# Patient Record
Sex: MALE | Race: WHITE | NOT HISPANIC OR LATINO | Employment: OTHER | URBAN - METROPOLITAN AREA
[De-identification: names, ages, dates, MRNs, and addresses within clinical notes are randomized per-mention and may not be internally consistent; named-entity substitution may affect disease eponyms.]

---

## 2022-01-01 ENCOUNTER — OFFICE VISIT (OUTPATIENT)
Dept: RADIATION ONCOLOGY | Facility: CLINIC | Age: 62
End: 2022-01-01
Attending: RADIOLOGY
Payer: COMMERCIAL

## 2022-01-01 ENCOUNTER — TELEPHONE (OUTPATIENT)
Dept: RADIATION ONCOLOGY | Facility: CLINIC | Age: 62
End: 2022-01-01
Payer: COMMERCIAL

## 2022-01-01 ENCOUNTER — TELEPHONE (OUTPATIENT)
Dept: RADIATION ONCOLOGY | Facility: CLINIC | Age: 62
End: 2022-01-01

## 2022-01-01 ENCOUNTER — HOSPITAL ENCOUNTER (EMERGENCY)
Facility: CLINIC | Age: 62
Discharge: HOME | End: 2022-03-08
Attending: RADIOLOGY | Admitting: EMERGENCY MEDICINE
Payer: COMMERCIAL

## 2022-01-01 ENCOUNTER — HOSPITAL ENCOUNTER (OUTPATIENT)
Dept: MRI IMAGING | Facility: CLINIC | Age: 62
Discharge: HOME | End: 2022-06-16
Attending: RADIOLOGY | Admitting: RADIOLOGY
Payer: MEDICARE

## 2022-01-01 ENCOUNTER — OFFICE VISIT (OUTPATIENT)
Dept: RADIATION ONCOLOGY | Facility: CLINIC | Age: 62
End: 2022-01-01
Attending: RADIOLOGY
Payer: MEDICARE

## 2022-01-01 ENCOUNTER — HOSPITAL ENCOUNTER (OUTPATIENT)
Dept: MRI IMAGING | Facility: CLINIC | Age: 62
End: 2022-03-09
Attending: RADIOLOGY
Payer: COMMERCIAL

## 2022-01-01 ENCOUNTER — PRE VISIT (OUTPATIENT)
Dept: RADIATION ONCOLOGY | Facility: CLINIC | Age: 62
End: 2022-01-01
Payer: COMMERCIAL

## 2022-01-01 ENCOUNTER — ONCOLOGY VISIT (OUTPATIENT)
Dept: RADIATION ONCOLOGY | Facility: CLINIC | Age: 62
End: 2022-01-01
Payer: MEDICARE

## 2022-01-01 VITALS
HEIGHT: 67 IN | OXYGEN SATURATION: 97 % | HEART RATE: 68 BPM | BODY MASS INDEX: 27.78 KG/M2 | WEIGHT: 177 LBS | SYSTOLIC BLOOD PRESSURE: 126 MMHG | DIASTOLIC BLOOD PRESSURE: 66 MMHG | RESPIRATION RATE: 16 BRPM

## 2022-01-01 VITALS
HEART RATE: 62 BPM | DIASTOLIC BLOOD PRESSURE: 75 MMHG | BODY MASS INDEX: 30.49 KG/M2 | OXYGEN SATURATION: 98 % | WEIGHT: 194.7 LBS | SYSTOLIC BLOOD PRESSURE: 135 MMHG

## 2022-01-01 VITALS
OXYGEN SATURATION: 100 % | SYSTOLIC BLOOD PRESSURE: 145 MMHG | HEART RATE: 52 BPM | DIASTOLIC BLOOD PRESSURE: 82 MMHG | RESPIRATION RATE: 16 BRPM

## 2022-01-01 DIAGNOSIS — C79.31 METASTASIS TO BRAIN (H): Primary | ICD-10-CM

## 2022-01-01 DIAGNOSIS — C79.31 METASTASIS TO BRAIN (H): ICD-10-CM

## 2022-01-01 LAB
FLUAV RNA SPEC QL NAA+PROBE: NEGATIVE
FLUBV RNA RESP QL NAA+PROBE: NEGATIVE
RSV RNA SPEC NAA+PROBE: NEGATIVE
SARS-COV-2 RNA RESP QL NAA+PROBE: NEGATIVE

## 2022-01-01 PROCEDURE — 77435 SBRT MANAGEMENT: CPT | Performed by: RADIOLOGY

## 2022-01-01 PROCEDURE — 77334 RADIATION TREATMENT AID(S): CPT | Mod: 26 | Performed by: RADIOLOGY

## 2022-01-01 PROCEDURE — 77370 RADIATION PHYSICS CONSULT: CPT | Performed by: RADIOLOGY

## 2022-01-01 PROCEDURE — A9585 GADOBUTROL INJECTION: HCPCS | Performed by: RADIOLOGY

## 2022-01-01 PROCEDURE — 77373 STRTCTC BDY RAD THER TX DLVR: CPT | Performed by: RADIOLOGY

## 2022-01-01 PROCEDURE — 77470 SPECIAL RADIATION TREATMENT: CPT | Mod: 26 | Performed by: RADIOLOGY

## 2022-01-01 PROCEDURE — 77295 3-D RADIOTHERAPY PLAN: CPT | Performed by: RADIOLOGY

## 2022-01-01 PROCEDURE — 70552 MRI BRAIN STEM W/DYE: CPT | Mod: 26 | Performed by: STUDENT IN AN ORGANIZED HEALTH CARE EDUCATION/TRAINING PROGRAM

## 2022-01-01 PROCEDURE — 77336 RADIATION PHYSICS CONSULT: CPT | Performed by: RADIOLOGY

## 2022-01-01 PROCEDURE — 77334 RADIATION TREATMENT AID(S): CPT | Performed by: RADIOLOGY

## 2022-01-01 PROCEDURE — 77263 THER RADIOLOGY TX PLNG CPLX: CPT | Performed by: RADIOLOGY

## 2022-01-01 PROCEDURE — 255N000002 HC RX 255 OP 636: Performed by: RADIOLOGY

## 2022-01-01 PROCEDURE — 70553 MRI BRAIN STEM W/O & W/DYE: CPT | Mod: 26 | Performed by: RADIOLOGY

## 2022-01-01 PROCEDURE — G0463 HOSPITAL OUTPT CLINIC VISIT: HCPCS | Performed by: RADIOLOGY

## 2022-01-01 PROCEDURE — C9803 HOPD COVID-19 SPEC COLLECT: HCPCS

## 2022-01-01 PROCEDURE — 87637 SARSCOV2&INF A&B&RSV AMP PRB: CPT | Performed by: EMERGENCY MEDICINE

## 2022-01-01 PROCEDURE — 77290 THER RAD SIMULAJ FIELD CPLX: CPT | Performed by: RADIOLOGY

## 2022-01-01 PROCEDURE — G0463 HOSPITAL OUTPT CLINIC VISIT: HCPCS | Mod: 25

## 2022-01-01 PROCEDURE — 77300 RADIATION THERAPY DOSE PLAN: CPT | Performed by: RADIOLOGY

## 2022-01-01 PROCEDURE — 99024 POSTOP FOLLOW-UP VISIT: CPT | Performed by: NURSE PRACTITIONER

## 2022-01-01 PROCEDURE — 999N000104 HC STATISTIC NO CHARGE

## 2022-01-01 PROCEDURE — 77300 RADIATION THERAPY DOSE PLAN: CPT | Mod: 26 | Performed by: RADIOLOGY

## 2022-01-01 PROCEDURE — 70553 MRI BRAIN STEM W/O & W/DYE: CPT

## 2022-01-01 PROCEDURE — 77470 SPECIAL RADIATION TREATMENT: CPT | Performed by: RADIOLOGY

## 2022-01-01 PROCEDURE — 70552 MRI BRAIN STEM W/DYE: CPT

## 2022-01-01 PROCEDURE — 77295 3-D RADIOTHERAPY PLAN: CPT | Mod: 26 | Performed by: RADIOLOGY

## 2022-01-01 RX ORDER — GADOBUTROL 604.72 MG/ML
8 INJECTION INTRAVENOUS ONCE
Status: COMPLETED | OUTPATIENT
Start: 2022-01-01 | End: 2022-01-01

## 2022-01-01 RX ORDER — PHENYTOIN SODIUM 300 MG/1
300 CAPSULE, EXTENDED RELEASE ORAL
COMMUNITY
Start: 2022-01-01 | End: 2022-01-01

## 2022-01-01 RX ORDER — LORATADINE 10 MG/1
10 TABLET ORAL
COMMUNITY
Start: 2022-01-01

## 2022-01-01 RX ORDER — FOLIC ACID 1 MG/1
1 TABLET ORAL
COMMUNITY
Start: 2022-01-01

## 2022-01-01 RX ORDER — MULTIVITAMIN/IRON/FOLIC ACID 18MG-0.4MG
1 TABLET ORAL DAILY
COMMUNITY
Start: 2022-01-01

## 2022-01-01 RX ORDER — ENOXAPARIN SODIUM 100 MG/ML
80 INJECTION SUBCUTANEOUS EVERY 12 HOURS
COMMUNITY
Start: 2022-01-01

## 2022-01-01 RX ORDER — LEVETIRACETAM 500 MG/1
500 TABLET ORAL 2 TIMES DAILY
COMMUNITY

## 2022-01-01 RX ORDER — GADOBUTROL 604.72 MG/ML
0.1 INJECTION INTRAVENOUS ONCE
Status: COMPLETED | OUTPATIENT
Start: 2022-01-01 | End: 2022-01-01

## 2022-01-01 RX ORDER — LATANOPROST 50 UG/ML
1 SOLUTION/ DROPS OPHTHALMIC AT BEDTIME
COMMUNITY

## 2022-01-01 RX ORDER — DEXAMETHASONE 1 MG
2 TABLET ORAL
COMMUNITY

## 2022-01-01 RX ORDER — ACETAMINOPHEN 500 MG
1000 TABLET ORAL EVERY 6 HOURS PRN
COMMUNITY
Start: 2022-01-01

## 2022-01-01 RX ADMIN — GADOBUTROL 8 ML: 604.72 INJECTION INTRAVENOUS at 08:28

## 2022-01-01 RX ADMIN — GADOBUTROL 7.5 ML: 604.72 INJECTION INTRAVENOUS at 13:55

## 2022-01-01 ASSESSMENT — ENCOUNTER SYMPTOMS
HEADACHES: 0
WEIGHT LOSS: 1
DOUBLE VISION: 0
SEIZURES: 1
SHORTNESS OF BREATH: 0
VOMITING: 0
NAUSEA: 0
FEVER: 0
MUSCULOSKELETAL NEGATIVE: 1
BLURRED VISION: 1
PHOTOPHOBIA: 0
DEPRESSION: 0
BLOOD IN STOOL: 0
DIARRHEA: 0

## 2022-02-23 NOTE — NURSING NOTE
Date: 2022   Age: 61 year old  Ethnicity:    Sex: male  : 1960   Lives In: [place], [place]      Diagnosis: Lung Cancer    Prior radiation therapy:   Site Treated: at  Facility: at  Dates: at  Dose: at    Site Treated: atr  Facility: a  Dates: ar  Dose: a    Prior chemotherapy:   Protocol: at  Facility: at  Dates: at     RN time with patient:  Educated on Gamma Knife;    Doctors: Wallace Corona at time of consult:  Does pt have a living will:  Does pt have implanted cardiac device:     Has pt fallen in past week:  Does pt feel steady on her feet:      Review Since Diagnosis:    21; to ER at [facility] due to a fall on the ice, struck his forehead and had a laceration.  Work up revealed a left frontal brain mass and right lung mass.  Pt was discharged to his home after refusing further work up at this time due to his wife having recently .      21; Head CT, 5.4 x 4.2 x 4.3 cm left frontal mass with edema, 10 mm left to right midline shift, no fracture or intracranial bleed     21; Cervical CT, no cervical spine fracture, mass right lung apex 2.7 x 2.3 x 2.2 cm    21; pt returned to the ER as had an episode of dizziness, nearly falling     21; consult by Dr. Wade Redd, pt notes dizziness is gone.  Wanting Brain MRI and CT CAP, continue the dexamethasone and keppra     21; Brain MRI, 5.3 x 4.4 x 4.3 cm left frontal mass                                     0.3 x 0.6 x 0.4 cm mass left parietal     CT CAP; spiculated lesion right lung apex 2.6 cm with right hilar involvement, few subtle hypodensities in liver indeterminate, no lymphadenopathy, no osseous lesion    21; seen by neurosurgery JESSICA Laird, pt still feeling needs to leave to take care of things before will proceed with any form of treatment     21; pt left AMA    12/15/21; PET, 2.6 cm right upper lobe lung mass, enlarged right hilar lymph node, no evidence distal mets to  neck/abd/pelvis     2/14/22; pt presented back to ER at [facility] to be evaluated for the lung and brain lesions     2/14/22; Brain MRI, 5.3 x 5.0 x 5.4 cm lesion left frontal, increase                                     1.7 cm lesion left temporal                                    0.8 cm lesion left parietal     2/16/22; crani by Dr. Chiang for resection of left frontal met, gross total resection, path showed metastatic carcinoma, lung primary being a diagnostic consideration    2/21/22; seen by PA for neurosurgery as inpt, keppra 3 months, dexamethasone taper, plan to meet with Dr. Mcgee, Dr. Cook to arrange GK    3/3/22; PET  3/3/22; to see Dr. Mcgee,     Chief Complaint: at consult

## 2022-02-26 NOTE — PROGRESS NOTES
Department of Radiation Oncology  [address] [address]  [phone]       Consultation Note    Name: Louis Sales MRN: [id number]   : 1960   Date of Service: Mar 2, 2022 Referring: Dr. Mcgee/ Dr. Cook        Reason for consultation: Discussion of GK SRS for management of intracranial metastases likely secondary to lung primary.     History of Present Illness   Mr. Sales is a 61 year old male with a recent diagnosis of lung cancer with brain metastases.    He initially presented to ER at [facility] on 2021 following a fall on the ice while chasing his dog. He sustained a laceration on his forehead. CT head demonstrated a 5.4 x 4.2 x 4.3 cm left frontal mass with surrounding vasogenic edema and a 10 mm midline shift. There was no evidence of fracture or intracranial bleeding.  CT cervical spine ruled out any spine fracture, but did  a spiculated nodule at the right lung apex. He was recommended brain MRI, but did not want any further workup at that time due to his wife passing away 3 weeks prior. He was started on Decadron.      However, he returned to ED on the next day following an episode of dizziness and found to have bradycardia. He was admitted for cardiac workup. A brain MRI was done on 2021 which redemonstrated the left frontal mass measuring 5.3 x 4.4 x 4.3 cm with intralesional hemorrhage and necrosis as well as another left parietal lesion measuring 0.3 x 0.6 x 0.4 cm. CT of the chest/abdomen/pelvis revealed a spiculated oval mass in the right lung apex measuring 2.6 x 2.0 cm with enlarged right hilar node up to 3 cm. 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     He did have an outpatient PET/CT scan which confirmed the hypermetabolism in the RUL mass and the right hilar node, but otherwise no metastases  "elsewhere in the body. He did not follow up with Dr. Mcgee, medical oncology.     He again was taken to the ED after a near syncope episode on 2/12, but didn't want to stay but agreed to return 2 days later on 2/14/22 for intervention. Stereotactic MRI done prior to surgery showed 3 lesions concerning for metastasis in left frontal (6.5 cm in oblique cross section), left parietal (0.8 cm) and left temporal (1.7 cm) areas.  He underwent left craniotomy and gross total resection of the left frontal lesion on 2/16/2022 by Dr. Chiang. Pathology was consistent with metastatic carcinoma, lung primary being a diagnostic consideration.     The patient was referred to us for discussion of GK SRS. Today he was accompanied by his daughter-in-law. He states that he has been recovering well from surgery. He reports occasional episodes of blurry vision which he attributs to his glaucoma. He denies recent fever, chills, nausea, vomiting, weakness, numbness, visual or auditory changes, weight or appetite changes. He believs he is not on any steroids anymore. However, he will check his medication list and inform us. He is going to meet Dr. Mcgee after obtaining PET/CT tomorrow.     Past Medical History:  Lumbar spine arthritis  Glaucoma  Chronic low back pain  Bradycardia    Past Surgical History:    Epidural steroid injection    Chemotherapy History:  None     Radiation History:  None     Implanted Cardiac Devices: No    Medications:  Keppra    Allergies:    Cat Dancer    Dust/dust mites    Social History:  3 sons. Wife recently passed away  1/4 PPD x 40 years  Occasional alcohol.   Smokes Marijuana    Family History:  Sister: cancer (patient could not recall)    Review of Systems   A 10-point review of systems was performed. Pertinent findings are noted in the HPI.    Physical Exam   ECOG Status: 1    Vitals:  /66   Pulse 68   Resp 16   Ht 1.702 m (5' 7\")   Wt 80.3 kg (177 lb)   SpO2 97%   BMI 27.72 kg/m      PHYSICAL " EXAMINATION:    Gen: Alert, in NAD  Eyes: Clean dry craniotomy incision with no signs of inflammation. Staples are in  HENT      Head: NC/AT     Ears: No external auricular lesions     Nose/sinus: No rhinorrhea or epistaxis     Oral Cavity/Oropharynx: MMM, no thrush noted  Pulm: Breathing comfortably on room air, no audible wheezes or ronchi  CV: Well-perfused, no cyanosis  Abdominal: Soft, nondistended  Skin: Normal color and turgor  Neurologic/MSK: Alert and oriented x3, CN II-XII intact, motor 5/5 throughout, sensation intact to light touch throughout. +2 DTRs. Finger-nose-finger and heel knee-shin intact.    Psych: Appropriate mood and affect     Imaging/Path/Labs   Imaging: Reviewed           Path: Reviewed     Labs: Reviewed     Assessment    Mr. Sales is a 61 year old male with a recent diagnosis of metastatic brain lesions. Work up revealed RUL lung mass and right hilar adenopathy. Even though his lung mass was not biopsied yet, he likely has metastatic lung cancer. He underwent left craniotomy and gross total resection of the symptomatic left frontal lesion. Imaging showed 2 additional smaller lesions. He is recovering well from surgery.    Plan   We recommend a course of GK SRS to his intracranial metastases for local control.  We plan to treat the left frontal resection cavity and the left temporal lesion (in close relation with the brainstem) using a hypofractionated approach over 3-5 fractions using the mask immobilization. For the smaller left parietal lesino, it would be feasible to treat in a single fraction.    We discussed the rationale, logistics, alternatives and potential acute and chronic related side effects including but not limited to fatigue, headaches, nausea, vomiting, and radiation necrosis.  We also discussed the follow-up plan after treatment.    At end of discussion, the patient agreed with the plan and signed the treatment consent.  The patient is scheduled for planning MRI and  simulation session on 3/9/2022. The procedure will be performed in conjunction with Dr. Cook.     The patient and his daughter-in-law had many questions during our conversation that were answered to their satisfaction and verbalized understanding.     He will see Dr. Mcgee tomorrow. If he is still on steroids, we anticipate Dr. Mcgee to clarify the taper schedule with him during the visit.     The patient was seen and discussed with staff, Dr. Simons.     Jarad Enriquez MD  PGY-5 Resident, Radiation Oncology  [facility]  Phone:[phone]  Pager:[phone]    I saw and examined the patient with the resident.  I have reviewed and edited the resident's note and agree with the plan of care.      I reviewed patient's chart, internal/external medical records, imaging studies (including actual images), labs and pathology reports.  I interviewed and counseled the patient face to face.  I additionally ordered tests (brain MRI)      60 minutes were spent on the date of the encounter doing chart review, history and exam, documentation and further activities as noted above.           Michael Simons MD

## 2022-03-02 NOTE — LETTER
3/2/2022         RE: Louis Sales  [address]  [address]  [facility] [address]        Dear Colleague,    Thank you for referring your patient, Louis Sales, to the [facility] RADIATION ONCOLOGY GAMMA [facility]. Please see a copy of my visit note below.       Department of Radiation Oncology  [facility]  [address] [place] [address]  [phone]       Consultation Note    Name: Louis Sales MRN: [id number]   : 1960   Date of Service: Mar 2, 2022 Referring: Dr. Mcgee/ Dr. Cook        Reason for consultation: Discussion of GK SRS for management of intracranial metastases likely secondary to lung primary.     History of Present Illness   Mr. Sales is a 61 year old male with a recent diagnosis of lung cancer with brain metastases.    He initially presented to ER at [facility] on 2021 following a fall on the ice while chasing his dog. He sustained a laceration on his forehead. CT head demonstrated a 5.4 x 4.2 x 4.3 cm left frontal mass with surrounding vasogenic edema and a 10 mm midline shift. There was no evidence of fracture or intracranial bleeding.  CT cervical spine ruled out any spine fracture, but did  a spiculated nodule at the right lung apex. He was recommended brain MRI, but did not want any further workup at that time due to his wife passing away 3 weeks prior. He was started on Decadron.      However, he returned to ED on the next day following an episode of dizziness and found to have bradycardia. He was admitted for cardiac workup. A brain MRI was done on 2021 which redemonstrated the left frontal mass measuring 5.3 x 4.4 x 4.3 cm with intralesional hemorrhage and necrosis as well as another left parietal lesion measuring 0.3 x 0.6 x 0.4 cm. CT of the chest/abdomen/pelvis revealed a spiculated oval mass in the right lung apex measuring 2.6 x 2.0 cm with enlarged right hilar node up to 3  cm. 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     He did have an outpatient PET/CT scan which confirmed the hypermetabolism in the RUL mass and the right hilar node, but otherwise no metastases elsewhere in the body. He did not follow up with Dr. Mcgee, medical oncology.     He again was taken to the ED after a near syncope episode on 2/12, but didn't want to stay but agreed to return 2 days later on 2/14/22 for intervention. Stereotactic MRI done prior to surgery showed 3 lesions concerning for metastasis in left frontal (6.5 cm in oblique cross section), left parietal (0.8 cm) and left temporal (1.7 cm) areas.  He underwent left craniotomy and gross total resection of the left frontal lesion on 2/16/2022 by Dr. Chiang. Pathology was consistent with metastatic carcinoma, lung primary being a diagnostic consideration.     The patient was referred to us for discussion of GK SRS. Today he was accompanied by his daughter-in-law. He states that he has been recovering well from surgery. He reports occasional episodes of blurry vision which he attributs to his glaucoma. He denies recent fever, chills, nausea, vomiting, weakness, numbness, visual or auditory changes, weight or appetite changes. He believs he is not on any steroids anymore. However, he will check his medication list and inform us. He is going to meet Dr. Mcgee after obtaining PET/CT tomorrow.     Past Medical History:  Lumbar spine arthritis  Glaucoma  Chronic low back pain  Bradycardia    Past Surgical History:    Epidural steroid injection    Chemotherapy History:  None     Radiation History:  None     Implanted Cardiac Devices: No    Medications:  Keppra    Allergies:    Cat Dancer    Dust/dust mites    Social History:  3 sons. Wife recently passed away  1/4 PPD x 40 years  Occasional alcohol.   Smokes Marijuana    Family History:  Sister: cancer (patient could not  "recall)    Review of Systems   A 10-point review of systems was performed. Pertinent findings are noted in the HPI.    Physical Exam   ECOG Status: 1    Vitals:  /66   Pulse 68   Resp 16   Ht 1.702 m (5' 7\")   Wt 80.3 kg (177 lb)   SpO2 97%   BMI 27.72 kg/m      PHYSICAL EXAMINATION:    Gen: Alert, in NAD  Eyes: Clean dry craniotomy incision with no signs of inflammation. Staples are in  HENT      Head: NC/AT     Ears: No external auricular lesions     Nose/sinus: No rhinorrhea or epistaxis     Oral Cavity/Oropharynx: MMM, no thrush noted  Pulm: Breathing comfortably on room air, no audible wheezes or ronchi  CV: Well-perfused, no cyanosis  Abdominal: Soft, nondistended  Skin: Normal color and turgor  Neurologic/MSK: Alert and oriented x3, CN II-XII intact, motor 5/5 throughout, sensation intact to light touch throughout. +2 DTRs. Finger-nose-finger and heel knee-shin intact.    Psych: Appropriate mood and affect     Imaging/Path/Labs   Imaging: Reviewed           Path: Reviewed     Labs: Reviewed     Assessment    Mr. Sales is a 61 year old male with a recent diagnosis of metastatic brain lesions. Work up revealed RUL lung mass and right hilar adenopathy. Even though his lung mass was not biopsied yet, he likely has metastatic lung cancer. He underwent left craniotomy and gross total resection of the symptomatic left frontal lesion. Imaging showed 2 additional smaller lesions. He is recovering well from surgery.    Plan   We recommend a course of GK SRS to his intracranial metastases for local control.  We plan to treat the left frontal resection cavity and the left temporal lesion (in close relation with the brainstem) using a hypofractionated approach over 3-5 fractions using the mask immobilization. For the smaller left parietal lesino, it would be feasible to treat in a single fraction.    We discussed the rationale, logistics, alternatives and potential acute and chronic related side effects " including but not limited to fatigue, headaches, nausea, vomiting, and radiation necrosis.  We also discussed the follow-up plan after treatment.    At end of discussion, the patient agreed with the plan and signed the treatment consent.  The patient is scheduled for planning MRI and simulation session on 3/9/2022. The procedure will be performed in conjunction with Dr. Cook.     The patient and his daughter-in-law had many questions during our conversation that were answered to their satisfaction and verbalized understanding.     He will see Dr. Mcgee tomorrow. If he is still on steroids, we anticipate Dr. Mcgee to clarify the taper schedule with him during the visit.     The patient was seen and discussed with staff, Dr. Simons.     Jarad Enriquez MD  PGY-5 Resident, Radiation Oncology  [facility]  Phone:[phone]  Pager:[phone]    I saw and examined the patient with the resident.  I have reviewed and edited the resident's note and agree with the plan of care.      I reviewed patient's chart, internal/external medical records, imaging studies (including actual images), labs and pathology reports.  I interviewed and counseled the patient face to face.  I additionally ordered tests (brain MRI)      60 minutes were spent on the date of the encounter doing chart review, history and exam, documentation and further activities as noted above.           Michael Simons MD            [facility]    Date: 2022   Age: 61 year old  Ethnicity:  Black  Sex: male  : 1960   Lives In: [place], [place]      Diagnosis: Lung Cancer    Prior radiation therapy:   none    Prior chemotherapy:   none    RN time with patient: 55 minutes   Educated on Gamma Knife; yes    Doctors: Dr. Yousif Chiang, no primary  Pain at time of consult: pt denies pain at time of consult  Does pt have a living will: pt does not have  Does pt have implanted cardiac device: pt has no implanted cardiac  device    Has pt fallen in past week: pt has not fallen in past week  Does pt feel steady on her feet: pt feels steady      Review Since Diagnosis:    21; to ER at [facility] due to a fall on the ice, struck his forehead and had a laceration.  Work up revealed a left frontal brain mass and right lung mass.  Pt was discharged to his home after refusing further work up at this time due to his wife having recently .      21; Head CT, 5.4 x 4.2 x 4.3 cm left frontal mass with edema, 10 mm left to right midline shift, no fracture or intracranial bleed     21; Cervical CT, no cervical spine fracture, mass right lung apex 2.7 x 2.3 x 2.2 cm    21; pt returned to the ER as had an episode of dizziness, nearly falling     21; consult by Dr. Wade Redd, pt notes dizziness is gone.  Wanting Brain MRI and CT CAP, continue the dexamethasone and keppra     21; Brain MRI, 5.3 x 4.4 x 4.3 cm left frontal mass                                     0.3 x 0.6 x 0.4 cm mass left parietal     CT CAP; spiculated lesion right lung apex 2.6 cm with right hilar involvement, few subtle hypodensities in liver indeterminate, no lymphadenopathy, no osseous lesion    21; seen by neurosurgery JESSICA Laird, pt still feeling needs to leave to take care of things before will proceed with any form of treatment     21; pt left AMA    12/15/21; PET, 2.6 cm right upper lobe lung mass, enlarged right hilar lymph node, no evidence distal mets to neck/abd/pelvis     22; pt presented back to ER at [facility] to be evaluated for the lung and brain lesions     22; Brain MRI, 5.3 x 5.0 x 5.4 cm lesion left frontal, increase                                     1.7 cm lesion left temporal                                    0.8 cm lesion left parietal     22; crani by Dr. Chiang for resection of left frontal met, gross total resection, path showed metastatic carcinoma, lung primary being a diagnostic  consideration    22; seen by PA for neurosurgery as inpt, keppra 3 months, dexamethasone taper, plan to meet with Dr. Mcgee, Dr. Cook to arrange GK    3/3/22; PET  3/3/22; to see Dr. Mcgee,     Chief Complaint: pt noted in retrospect he was having anxiety issues and headaches were every other day, leg weakness on the right.  Pt notes that since the crani his symptoms have resolved.  Pt notes vision is blurred on left side as of recently, no bright lights or double   Review of Systems   Constitutional: Positive for weight loss. Negative for fever and malaise/fatigue.   HENT: Negative for hearing loss.    Eyes: Positive for blurred vision. Negative for double vision and photophobia.   Respiratory: Negative for shortness of breath.    Cardiovascular: Negative for chest pain and leg swelling.   Gastrointestinal: Negative for blood in stool, diarrhea, nausea and vomiting.   Genitourinary: Negative.    Musculoskeletal: Negative.    Neurological: Positive for seizures. Negative for headaches.   Psychiatric/Behavioral: Negative for depression.        Pt 's wife   suddenly in 2021               Again, thank you for allowing me to participate in the care of your patient.      Sincerely,    Michael Simons MD

## 2022-03-02 NOTE — PROGRESS NOTES
HPI    Date: 2022   Age: 61 year old  Ethnicity:  Black  Sex: male  : 1960   Lives In: [place], [place]      Diagnosis: Lung Cancer    Prior radiation therapy:   none    Prior chemotherapy:   none    RN time with patient: 55 minutes   Educated on Gamma Knife; yes    Doctors: Dr. Yousif Chiang, no primary  Pain at time of consult: pt denies pain at time of consult  Does pt have a living will: pt does not have  Does pt have implanted cardiac device: pt has no implanted cardiac device    Has pt fallen in past week: pt has not fallen in past week  Does pt feel steady on her feet: pt feels steady      Review Since Diagnosis:    21; to ER at [facility] due to a fall on the ice, struck his forehead and had a laceration.  Work up revealed a left frontal brain mass and right lung mass.  Pt was discharged to his home after refusing further work up at this time due to his wife having recently .      21; Head CT, 5.4 x 4.2 x 4.3 cm left frontal mass with edema, 10 mm left to right midline shift, no fracture or intracranial bleed     21; Cervical CT, no cervical spine fracture, mass right lung apex 2.7 x 2.3 x 2.2 cm    21; pt returned to the ER as had an episode of dizziness, nearly falling     21; consult by Dr. Wade Redd, pt notes dizziness is gone.  Wanting Brain MRI and CT CAP, continue the dexamethasone and keppra     21; Brain MRI, 5.3 x 4.4 x 4.3 cm left frontal mass                                     0.3 x 0.6 x 0.4 cm mass left parietal     CT CAP; spiculated lesion right lung apex 2.6 cm with right hilar involvement, few subtle hypodensities in liver indeterminate, no lymphadenopathy, no osseous lesion    21; seen by neurosurgery JESSICA Laird, pt still feeling needs to leave to take care of things before will proceed with any form of treatment     21; pt left AMA    12/15/21; PET, 2.6 cm right upper lobe lung mass, enlarged right hilar lymph node, no  evidence distal mets to neck/abd/pelvis     22; pt presented back to ER at [facility] to be evaluated for the lung and brain lesions     22; Brain MRI, 5.3 x 5.0 x 5.4 cm lesion left frontal, increase                                     1.7 cm lesion left temporal                                    0.8 cm lesion left parietal     22; crani by Dr. Chiang for resection of left frontal met, gross total resection, path showed metastatic carcinoma, lung primary being a diagnostic consideration    22; seen by PA for neurosurgery as inpt, keppra 3 months, dexamethasone taper, plan to meet with Dr. Mcgee, Dr. Cook to arrange GK    3/3/22; PET  3/3/22; to see Dr. Mcgee,     Chief Complaint: pt noted in retrospect he was having anxiety issues and headaches were every other day, leg weakness on the right.  Pt notes that since the crani his symptoms have resolved.  Pt notes vision is blurred on left side as of recently, no bright lights or double   Review of Systems   Constitutional: Positive for weight loss. Negative for fever and malaise/fatigue.   HENT: Negative for hearing loss.    Eyes: Positive for blurred vision. Negative for double vision and photophobia.   Respiratory: Negative for shortness of breath.    Cardiovascular: Negative for chest pain and leg swelling.   Gastrointestinal: Negative for blood in stool, diarrhea, nausea and vomiting.   Genitourinary: Negative.    Musculoskeletal: Negative.    Neurological: Positive for seizures. Negative for headaches.   Psychiatric/Behavioral: Negative for depression.        Pt 's wife   suddenly in 2021

## 2022-03-09 NOTE — LETTER
Date:March 9, 2022      Patient was self referred, no letter generated. Do not send.        [facility] Information

## 2022-03-09 NOTE — LETTER
3/9/2022         RE: Louis Sales  [address]  [address]  [place] [address]        Dear Colleague,    Thank you for referring your patient, Louis Sales, to the [facility] RADIATION ONCOLOGY GAMMA KNIFE. Please see a copy of my visit note below.    Name:Louis Sales  :1960  Medical Record # [id number]  Diagnosis:C79.31  Date of Treatment Planning: 3/09/2022    Gamma Knife Simulation Note       After thorough review of this patient s clinical and radiographic data, I determined Fractionated Stereotactic Radiosurgery using the ICON Gamma Knife was indicated as explained in my consult with the patient.       The patient was brought to the Gamma Knife suite.  The MASK was fabricated consisting of an accuform mold at the posterior skull and a special aquaplast mask over the face and chin.        The High Definition Motion Management (HDMM) system consists of an infrared stereoscopic camera, a set of reference markers, and a patient marker will be placed.    A cone beam CT scan will be done to use as reference and for fusion.      A stereotactic brain   planning MRI with gadolinium  will be performed.  The MRI images will be transferred electronically to the Gamma Knife treatment planning computer.  The [place] Gamma Knife treatment planning software will be used to design the optimal treatment plan.       procedures will be done prior to treatment.        Michael Simons MD      Again, thank you for allowing me to participate in the care of your patient.        Sincerely,        Michael Simons MD

## 2022-03-09 NOTE — PROGRESS NOTES
PREOPERATIVE DIAGNOSIS:  Metastatic lung carcinoma     POSTOPERATIVE DIAGNOSIS:  Same     OPERATIVE PROCEDURE:  Gamma Knife radiosurgery to three lesions, including two complex lesions     SURGEON:  Geo Cook MD     ASSISTANT:  None     ANESTHESIA:  None     INDICATIONS FOR THE PROCEDURE:  Mr. Louis Sales is a 61 year-old male with a history of metastatic lung carcinoma.  He underwent left frontal craniotomy for resection of a dominant metastasis measuring 5.4cm with Dr. Chiang on 2/16/2022.  Gamma Knife stereotactic radiosurgery was now recommended for treatment of the complex resection cavity measuring greater than 3.5cm.  We are also treating a complex medial left temporal lesion measuring 1.7cm adjacent to the brainstem and a left parietal lesion measuring 0.8cm.  The two complex lesions will be treated with a total of 25 Gy in 5 fractions.  The left parietal lesion will be treated with 20 Gy in a single fraction.  Today is day one of treatment.     DESCRIPTION OF THE PROCEDURE:  On the morning of the procedure, the patient was brought to the Gamma Knife radiosurgery area.  A pre-procedure pause was performed to confirm the identity and date of birth of the patient, as well as the procedure site.  A mask was then fabricated for the stereotactic radiosurgery treatment.  The patient tolerated this well.     The patient was taken to the MRI scanner where an MRI with gadolinium contrast was obtained.  The patient returned from MRI.  The lesions were outlined on the planning software and we made a conformal dose outline for each lesion.  Dr. Simons selected the radiation dose for each lesion.  Measurements were taken,  steps performed, and the patient was then brought into the treatment room.  Radiation was given according to the prescription.     The patient was then taken out of the unit and out of the treatment room.  After observation, the patient was discharged.  Follow up arrangements  will be made for the patient.     I attest that I was present for the entirety of the case, including pre-procedure assessment, creation of the mask, treatment planning, and treatment delivery.

## 2022-03-09 NOTE — PROGRESS NOTES
Name:Louis Sales  :1960  Medical Record # [id number]  Diagnosis:C79.31  Date of Treatment Planning: 3/09/2022    Gamma Knife Simulation Note       After thorough review of this patient s clinical and radiographic data, I determined Fractionated Stereotactic Radiosurgery using the ICON Gamma Knife was indicated as explained in my consult with the patient.       The patient was brought to the Gamma Knife suite.  The MASK was fabricated consisting of an accuform mold at the posterior skull and a special aquaplast mask over the face and chin.        The High Definition Motion Management (HDMM) system consists of an infrared stereoscopic camera, a set of reference markers, and a patient marker will be placed.    A cone beam CT scan will be done to use as reference and for fusion.      A stereotactic brain   planning MRI with gadolinium  will be performed.  The MRI images will be transferred electronically to the Gamma Knife treatment planning computer.  The [employer] Gamma Knife treatment planning software will be used to design the optimal treatment plan.       procedures will be done prior to treatment.        Michael Simons MD

## 2022-03-09 NOTE — LETTER
3/9/2022         RE: Louis Sales  [address]  [address]  [facility] [address]        Dear Colleague,    Thank you for referring your patient, Louis Sales, to the [facility] RADIATION ONCOLOGY GAMMA KNIFE. Please see a copy of my visit note below.    PREOPERATIVE DIAGNOSIS:  Metastatic lung carcinoma     POSTOPERATIVE DIAGNOSIS:  Same     OPERATIVE PROCEDURE:  Gamma Knife radiosurgery to three lesions, including two complex lesions     SURGEON:  Geo Cook MD     ASSISTANT:  None     ANESTHESIA:  None     INDICATIONS FOR THE PROCEDURE:  Mr. Louis Sales is a 61 year-old male with a history of metastatic lung carcinoma.  He underwent left frontal craniotomy for resection of a dominant metastasis measuring 5.4cm with Dr. Chiang on 2/16/2022.  Gamma Knife stereotactic radiosurgery was now recommended for treatment of the complex resection cavity measuring greater than 3.5cm.  We are also treating a complex medial left temporal lesion measuring 1.7cm adjacent to the brainstem and a left parietal lesion measuring 0.8cm.  The two complex lesions will be treated with a total of 25 Gy in 5 fractions.  The left parietal lesion will be treated with 20 Gy in a single fraction.  Today is day one of treatment.     DESCRIPTION OF THE PROCEDURE:  On the morning of the procedure, the patient was brought to the Gamma Knife radiosurgery area.  A pre-procedure pause was performed to confirm the identity and date of birth of the patient, as well as the procedure site.  A mask was then fabricated for the stereotactic radiosurgery treatment.  The patient tolerated this well.     The patient was taken to the MRI scanner where an MRI with gadolinium contrast was obtained.  The patient returned from MRI.  The lesions were outlined on the planning software and we made a conformal dose outline for each lesion.  Dr. Simons selected the radiation dose for each lesion.  Measurements were taken, quality  control steps performed, and the patient was then brought into the treatment room.  Radiation was given according to the prescription.     The patient was then taken out of the unit and out of the treatment room.  After observation, the patient was discharged.  Follow up arrangements will be made for the patient.     I attest that I was present for the entirety of the case, including pre-procedure assessment, creation of the mask, treatment planning, and treatment delivery.      Name: Louis Sales  : 1960 Medical Record #: [id number]  Diagnosis: C79.31 Secondary malignant neoplasm of brain  Date of Treatment: 2022  Referring Physicians: Geo Cook, Tumor Registry    GAMMA KNIFE DAILY TREATMENT PROCEDURE NOTE     Fraction 1 of 5  Treatment Summary:  Radiation Oncology - Course: 1 Protocol:    Treatment Site Current Dose Modality From To Elapsed Days Fx.   1a L Frontal Cavity 500 cGy [place] 60 3/9/2022 3/9/2022 0 1   1b L Frontal 600 cGy [place] 60 3/9/2022 3/9/2022 0 1   1c L Parietal  [place] 60                    DESCRIPTION OF PROCEDURE:    On 3/09/2022 the patient was brought to the [facility] Gamma Knife IconTM suite at [facility] and treated with fractionated stereotactic radiotherapy.     The [employer] Gamma Knife IconTM Plan software was used to create a highly conformal dose distribution using the number and size collimators detailed above.     TREATMENT:    The patient was brought to the Gamma Knife suite. A timeout was performed to confirm the correct patient and correct procedure.    Patient was identified by 2 methods.  Site was verified.    The mask was placed and a cone beam CT was done and fused to the previously approved plan.        The physicist and I evaluated the fusion and confirmed the target coverage after auto-registration.      The treatment was delivered using the [place] Gamma Knife IconTM without complication.      The mask  was removed and the patient was discharged home in stable condition.    The patient tolerated the treatment well and had no complications.    Approved by:  Michael Simons MD          Again, thank you for allowing me to participate in the care of your patient.      Sincerely,    Michael Simons MD

## 2022-03-09 NOTE — PROGRESS NOTES
Name: Louis Sales  : 1960 Medical Record #: [id number]  Diagnosis: C79.31 Secondary malignant neoplasm of brain  Date of Treatment: 2022  Referring Physicians: Geo Cook, Tumor Registry    GAMMA KNIFE DAILY TREATMENT PROCEDURE NOTE     Fraction 1 of 5  Treatment Summary:  Radiation Oncology - Course: 1 Protocol:    Treatment Site Current Dose Modality From To Elapsed Days Fx.   1a L Frontal Cavity 500 cGy [place] 60 3/9/2022 3/9/2022 0 1   1b L Frontal 600 cGy [place] 60 3/9/2022 3/9/2022 0 1   1c L Parietal  [place] 60                    DESCRIPTION OF PROCEDURE:    On 3/09/2022 the patient was brought to the [facility] Gamma Knife IconTM suite at [facility] and treated with fractionated stereotactic radiotherapy.     The [employer] Gamma Knife IconTM Plan software was used to create a highly conformal dose distribution using the number and size collimators detailed above.     TREATMENT:    The patient was brought to the Gamma Knife suite. A timeout was performed to confirm the correct patient and correct procedure.    Patient was identified by 2 methods.  Site was verified.    The mask was placed and a cone beam CT was done and fused to the previously approved plan.        The physicist and I evaluated the fusion and confirmed the target coverage after auto-registration.      The treatment was delivered using the [place] Gamma Knife IconTM without complication.      The mask was removed and the patient was discharged home in stable condition.    The patient tolerated the treatment well and had no complications.            Approved by:  Michael Simons MD

## 2022-03-10 NOTE — PROGRESS NOTES
Name: Louis Sales  : 1960 Medical Record #: [id number]  Diagnosis: C79.31 Secondary malignant neoplasm of brain  Date of Treatment: March 10, 2022  Referring Physicians: Geo Cook, Tumor Registry    GAMMA KNIFE DAILY TREATMENT PROCEDURE NOTE     Fraction 2 of 5  Treatment Summary:  Radiation Oncology - Course: 1 Protocol:    Treatment Site Current Dose Modality From To Elapsed Days Fx.   1a L Frontal Cavity 1000 cGy [place] 60 3/9/2022 3/10/2022 1 2   1b L Frontal 1200 cGy [place] 60 3/9/2022 3/10/2022 1 2   1c L Parietal 2000 cGy [place] 60 3/10/2022 3/10/2022 0 1                DESCRIPTION OF PROCEDURE:    On 3/10/2022 the patient was brought to the [facility] Gamma Knife IconTM suite at [facility] and treated with fractionated stereotactic radiotherapy.     The [employer] Gamma Knife IconTM Plan software was used to create a highly conformal dose distribution using the number and size collimators detailed above.     TREATMENT:    The patient was brought to the Gamma Knife suite. A timeout was performed to confirm the correct patient and correct procedure.    Patient was identified by 2 methods.  Site was verified.    The mask was placed and a cone beam CT was done and fused to the previously approved plan.        The physicist and I evaluated the fusion and confirmed the target coverage after auto-registration.      The treatment was delivered using the [place] Gamma Knife IconTM without complication.      The mask was removed and the patient was discharged home in stable condition.    The patient tolerated the treatment well and had no complications.            Approved by:  Michael Simons MD

## 2022-03-10 NOTE — LETTER
3/10/2022         RE: Louis Sales  [address]  [address]  [facility] [address]        Dear Colleague,    Thank you for referring your patient, Louis Sales, to the [facility] RADIATION ONCOLOGY GAMMA KNIFE. Please see a copy of my visit note below.    Name: Louis Sales.  : 1960 Medical Record #: [id number]  Diagnosis: C79.31 Secondary malignant neoplasm of brain  Date of Treatment: March 10, 2022  Referring Physicians: Geo Cook, Tumor Registry    GAMMA KNIFE DAILY TREATMENT PROCEDURE NOTE     Fraction 2 of 5  Treatment Summary:  Radiation Oncology - Course: 1 Protocol:    Treatment Site Current Dose Modality From To Elapsed Days Fx.   1a L Frontal Cavity 1000 cGy [place] 60 3/9/2022 3/10/2022 1 2   1b L Frontal 1200 cGy [place] 60 3/9/2022 3/10/2022 1 2   1c L Parietal 2000 cGy [place] 60 3/10/2022 3/10/2022 0 1                DESCRIPTION OF PROCEDURE:    On 3/10/2022 the patient was brought to the [facility] Gamma Knife IconTM suite at [facility] and treated with fractionated stereotactic radiotherapy.     The [employer] Gamma Knife IconTM Plan software was used to create a highly conformal dose distribution using the number and size collimators detailed above.     TREATMENT:    The patient was brought to the Gamma Knife suite. A timeout was performed to confirm the correct patient and correct procedure.    Patient was identified by 2 methods.  Site was verified.    The mask was placed and a cone beam CT was done and fused to the previously approved plan.        The physicist and I evaluated the fusion and confirmed the target coverage after auto-registration.      The treatment was delivered using the [place] Gamma Knife IconTM without complication.      The mask was removed and the patient was discharged home in stable condition.    The patient tolerated the treatment well and had no complications.            Approved by:  Michael  MD Ronak        Again, thank you for allowing me to participate in the care of your patient.        Sincerely,        Michael Simons MD

## 2022-03-10 NOTE — LETTER
Date:March 10, 2022      Provider requested that no letter be sent. Do not send.       [facility]

## 2022-03-11 NOTE — PROGRESS NOTES
Name: Louis Sales  : 1960 Medical Record #: [id number]  Diagnosis: C79.31 Secondary malignant neoplasm of brain  Date of Treatment: 2022  Referring Physicians: Geo Cook, Tumor Registry    GAMMA KNIFE DAILY TREATMENT PROCEDURE NOTE     Fraction 3 of 5  Treatment Summary:  Radiation Oncology - Course: 1 Protocol:    Treatment Site Current Dose Modality From To Elapsed Days Fx.   1a L Frontal Cavity 1500 cGy [place] 60 3/9/2022 3/11/2022 2 3   1b L Frontal 1800 cGy [place] 60 3/9/2022 3/11/2022 2 3   1c L Parietal 2000 cGy [place] 60 3/10/2022 3/10/2022 0 1                DESCRIPTION OF PROCEDURE:    On 3/11/2022 the patient was brought to the [facility] Gamma Knife IconTM suite at [facility] and treated with fractionated stereotactic radiotherapy.     The [employer] Gamma Knife IconTM Plan software was used to create a highly conformal dose distribution using the number and size collimators detailed above.     TREATMENT:    The patient was brought to the Gamma Knife suite. A timeout was performed to confirm the correct patient and correct procedure.    Patient was identified by 2 methods.  Site was verified.    The mask was placed and a cone beam CT was done and fused to the previously approved plan.        The physicist and I evaluated the fusion and confirmed the target coverage after auto-registration.      The treatment was delivered using the [place] Gamma Knife IconTM without complication.      The mask was removed and the patient was discharged home in stable condition.    The patient tolerated the treatment well and had no complications.            Approved by:  Jonathan Alaniz MD/PhD

## 2022-03-14 NOTE — LETTER
3/14/2022         RE: Louis Sales  [address]  [address]  [facility] [address]        Dear Colleague,    Thank you for referring your patient, Louis Sales, to the [facility] RADIATION ONCOLOGY GAMMA KNIFE. Please see a copy of my visit note below.      Name: Louis Sales.  : 1960 Medical Record #: [id number]  Diagnosis: C79.31 Secondary malignant neoplasm of brain  Date of Treatment: 2022  Referring Physicians: Geo Cook, Tumor Registry    GAMMA KNIFE DAILY TREATMENT PROCEDURE NOTE     Fraction 4 of 5  Treatment Summary:  Radiation Oncology - Course: 1 Protocol:    Treatment Site Current Dose Modality From To Elapsed Days Fx.   1a L Frontal Cavity 2000 cGy [place] 60 3/9/2022 3/14/2022 5 4   1b L Frontal 2400 cGy [place] 60 3/9/2022 3/14/2022 5 4   1c L Parietal 2000 cGy [place] 60 3/10/2022 3/10/2022 0 1                DESCRIPTION OF PROCEDURE:    On 3/14/2022 the patient was brought to the [facility] Gamma Knife IconTM suite at [facility] and treated with fractionated stereotactic radiotherapy.     The [employer] Gamma Knife IconTM Plan software was used to create a highly conformal dose distribution using the number and size collimators detailed above.     TREATMENT:    The patient was brought to the Gamma Knife suite. A timeout was performed to confirm the correct patient and correct procedure.    Patient was identified by 2 methods.  Site was verified.    The mask was placed and a cone beam CT was done and fused to the previously approved plan.        The physicist and I evaluated the fusion and confirmed the target coverage after auto-registration.      The treatment was delivered using the [place] Gamma Knife IconTM without complication.      The mask was removed and the patient was discharged home in stable condition.    The patient tolerated the treatment well and had no complications.            Approved by:  Briseyda  MD Craig        Again, thank you for allowing me to participate in the care of your patient.        Sincerely,        Briseyda Srinivasan MD

## 2022-03-14 NOTE — LETTER
Date:March 15, 2022      Patient was self referred, no letter generated. Do not send.        [facility] Health Information

## 2022-03-15 NOTE — LETTER
Date:March 25, 2022      Patient was self referred, no letter generated. Do not send.        [facility] Health Information

## 2022-03-15 NOTE — LETTER
3/15/2022         RE: Louis Sales  [address]  [address]  [facility] [address]        Dear Colleague,    Thank you for referring your patient, Louis Sales, to the [facility] RADIATION ONCOLOGY GAMMA KNIFE. Please see a copy of my visit note below.      Name: Louis Sales.  : 1960 Medical Record #: [id number]  Diagnosis: C79.31 Secondary malignant neoplasm of brain  Date of Treatment: March 15, 2022  Referring Physicians: Geo Cook, Tumor Registry    GAMMA KNIFE DAILY TREATMENT PROCEDURE NOTE     Fraction 5 of 5  Treatment Summary:  Radiation Oncology - Course: 1 Protocol:    Treatment Site Current Dose Modality From To Elapsed Days Fx.   1a L Frontal Cavity 2500 cGy [place] 60 3/9/2022 3/15/2022 6 5   1b L Frontal 3000 cGy [place] 60 3/9/2022 3/15/2022 6 5   1c L Parietal 2000 cGy [place] 60 3/10/2022 3/10/2022 0 1                DESCRIPTION OF PROCEDURE:    On 3/15/2022 the patient was brought to the [facility] Gamma Knife IconTM suite at [facility] and treated with fractionated stereotactic radiotherapy.     The [employer] Gamma Knife IconTM Plan software was used to create a highly conformal dose distribution using the number and size collimators detailed above.     TREATMENT:    The patient was brought to the Gamma Knife suite. A timeout was performed to confirm the correct patient and correct procedure.    Patient was identified by 2 methods.  Site was verified.    The mask was placed and a cone beam CT was done and fused to the previously approved plan.        The physicist and I evaluated the fusion and confirmed the target coverage after auto-registration.      The treatment was delivered using the [employer] Gamma Knife IconTM without complication.      The mask was removed and the patient was discharged home in stable condition.    The patient tolerated the treatment well and had no complications.            Approved by:  CHLOÉ  Deep Caro MD        Again, thank you for allowing me to participate in the care of your patient.        Sincerely,        Deep Caro MD

## 2022-03-21 NOTE — TELEPHONE ENCOUNTER
A call was placed to Louis in follow up to his Gamma Knife fractionated treatments ending on 3/15/22.  Louis said he thought he was doing very well.  Louis said he still has a little aching by the incision area on his head but he denied a headache, nausea or any problems.  Louis thought the treatments went well.

## 2022-03-24 NOTE — PROGRESS NOTES
Name: Louis Sales  : 1960 Medical Record #: [id number]  Diagnosis: C79.31 Secondary malignant neoplasm of brain  Date of Treatment: March 15, 2022  Referring Physicians: Geo Cook, Tumor Registry    GAMMA KNIFE DAILY TREATMENT PROCEDURE NOTE     Fraction 5 of 5  Treatment Summary:  Radiation Oncology - Course: 1 Protocol:    Treatment Site Current Dose Modality From To Elapsed Days Fx.   1a L Frontal Cavity 2500 cGy [place] 60 3/9/2022 3/15/2022 6 5   1b L Frontal 3000 cGy [place] 60 3/9/2022 3/15/2022 6 5   1c L Parietal 2000 cGy [place] 60 3/10/2022 3/10/2022 0 1                DESCRIPTION OF PROCEDURE:    On 3/15/2022 the patient was brought to the [facility] Gamma Knife IconTM suite at [facility] and treated with fractionated stereotactic radiotherapy.     The [employer] Gamma Knife IconTM Plan software was used to create a highly conformal dose distribution using the number and size collimators detailed above.     TREATMENT:    The patient was brought to the Gamma Knife suite. A timeout was performed to confirm the correct patient and correct procedure.    Patient was identified by 2 methods.  Site was verified.    The mask was placed and a cone beam CT was done and fused to the previously approved plan.        The physicist and I evaluated the fusion and confirmed the target coverage after auto-registration.      The treatment was delivered using the [employer] Gamma Knife IconTM without complication.      The mask was removed and the patient was discharged home in stable condition.    The patient tolerated the treatment well and had no complications.            Approved by:  CHLOÉ Caro MD

## 2022-05-10 NOTE — PROCEDURES
Radiotherapy Treatment Summary          Date of Report: May  9, 2022     PATIENT: LIZZETH SALES  MEDICAL RECORD NO: [id number]  : 1960     DIAGNOSIS: C79.31 Secondary malignant neoplasm of brain  INTENT OF RADIOTHERAPY: Palliation  PATHOLOGY: carcinoma, favoring lung primary                           STAGE: IV  CONCURRENT SYSTEMIC THERAPY:  None      Details of the treatments summarized below are found in records kept in the Department of Radiation Oncology at [facility].     Treatment Summary:  Radiation Oncology - Course: 1 Protocol:   Treatment Site Current Dose Modality From To Elapsed Days Fx.  1a L Frontal Cavity  2,500 cGy [place] 60  3/09/2022  3/15/2022   6  5  1b L Frontal  3,000 cGy [place] 60  3/09/2022  3/15/2022   6  5  1c L Parietal  2,000 cGy [place] 60  3/10/2022  3/10/2022   1                Dose per Fraction:  500 cGy per fraction to the left frontal cavity; 600 cGy per fraction to the left frontal   lesion; 2000 cGy per fraction to the left parietal lesion          Total Dose: see above.         COMMENTS:       Mr. Sales is a 61 year old male with a recent diagnosis of lung cancer with brain   metastases.  He initially presented to ER at [facility] on 2021 following a fall on the ice   while chasing his dog.  CT head demonstrated a 5.4 x 4.2 x 4.3 cm left frontal mass with surrounding vasogenic   edema and a 10 mm midline shift. There was no evidence of fracture or intracranial bleeding.  CT cervical spine   ruled out any spine fracture, but did  a spiculated nodule at the right lung apex. A brain MRI was   done on 2021 which redemonstrated the left frontal mass measuring 5.3 x 4.4 x 4.3 cm with intralesional   hemorrhage and necrosis as well as another left parietal lesion measuring 0.3 x 0.6 x 0.4 cm. CT of the   chest/abdomen/pelvis revealed a spiculated oval mass in the right lung apex measuring 2.6 x 2.0 cm with   enlarged right hilar node up to 3 cm. He  was recommended surgical resection, but declined as he had important   business to take care of with recent death of his wife.      He did have an outpatient PET/CT scan which confirmed the hypermetabolism in the RUL mass and the right   hilar node, but otherwise no metastases elsewhere in the body. He did not follow up with Dr. Mcgee, medical   oncology.      He again was taken to the ED after a near syncope episode on 2/12, but didn't want to stay but agreed to return 2   days later on 2/14/22 for intervention. Stereotactic MRI done prior to surgery showed 3 lesions concerning for   metastasis in left frontal (6.5 cm in oblique cross section), left parietal (0.8 cm) and left temporal (1.7 cm)   areas.  He underwent left craniotomy and gross total resection of the left frontal lesion on 2/16/2022 by Dr. Chiang. Pathology was consistent with metastatic carcinoma, lung primary being a diagnostic consideration.      The patient was referred to us for GK SRS. He tolerated the treatment well without any toxicities.      ED visits/hospitalizations: None     Missed treatments: None     Acute Toxicity Profile by CTC v5.0:  None related to GK SRS     PAIN MANAGEMENT:  not required.                     FOLLOW UP PLAN:         1. 1 month telephone follow up with Ms. Genevieve Bello NP  2. 3 month follow up with repeat brain MRI   3. Follow up with Dr. Mcgee in Medical Oncology as scheduled.                           Staff Physician: Michael Simons M.D.  Physicist: Kal Bell , PhD     CC: MD Geo Pozo MD                                        Radiation Oncology:  [facility] [address] [place] [address]

## 2022-05-12 NOTE — LETTER
2022         RE: Louis Sales  [address]  [address]  [place] [address]        Dear Colleague,    Thank you for referring your patient, Louis Sales, to the [facility] RADIATION ONCOLOGY. Please see a copy of my visit note below.    Radiation Oncology Follow-up Visit  May 12, 2022    Louis Sales  MRN: [id number]   : 1960     DISEASE TREATED:   Brain mets    RADIATION THERAPY DELIVERED:   1a L Frontal Cavity       2,500 cGy       [place] 60          3/09/2022        3/15/2022           1b L Frontal      3,000 cGy       [place] 60          3/09/2022        3/15/2022           1c L Parietal     2,000 cGy       [place] 60          3/10/2022        3/10/2022                        INTERVAL SINCE COMPLETION OF RADIATION THERAPY:   2 months    SUBJECTIVE:   Louis Sales is a 61 year old male who is here today for routine 2 month follow up after completing radiation therapy.  Louis states his gamma knife treatment went very well.  He states he has an occasional headache about once or twice a week.  States that it usually just goes away on its own but if it does not he will take a Tylenol and that seems to help.  He denies any other neurological symptoms.  He states he does not have any friends any longer because he is not smoking.    ROS:  Complete review of systems is negative except for symptoms discussed in subjective above.    Current Outpatient Medications   Medication     dexamethasone (DECADRON) 1 MG tablet     dorzolamide-timolol PF (COSOPT) 22.3-6.8 MG/ML opthalmic solution     latanoprost (XALATAN) 0.005 % ophthalmic solution     levETIRAcetam (KEPPRA) 500 MG tablet     No current facility-administered medications for this visit.          Allergies   Allergen Reactions     Dust Mites        Past Medical History:   Diagnosis Date     Borderline glaucoma with ocular hypertension, bilateral      Difficulty walking      Lumbago      Lung cancer (H)          PHYSICAL  EXAM:  There were no vitals taken for this visit.  Gen: Alert, in NAD  Neck::  Supple.  No masses or lymphaednopathy palpated.  Pulm: No wheezing, stridor or respiratory distress  CV: Well-perfused, no cyanosis, no pedal edema  Skin: Normal color and turgor  Psychiatric: Appropriate mood and affect      LABS AND IMAGING:  Reviewed.    IMPRESSION:   Mr. Sales is a 61 year old male with brain mets status post gamma knife 2 months ago and doing very well    PLAN:   Patient has recovered nicely from acute side effects of radiation therapy.  Patient has an MRI and appointment with Dr. Simons scheduled for 6/16.  Patient was unaware of these appointments.  We did review them and we will also send him a written schedule in the mail per his request.      Genevieve Bello NP  Radiation Oncology  [facility]

## 2022-05-12 NOTE — PROGRESS NOTES
Radiation Oncology Follow-up Visit  May 12, 2022    Louis Sales  MRN: [id number]   : 1960     DISEASE TREATED:   Brain mets    RADIATION THERAPY DELIVERED:   1a L Frontal Cavity       2,500 cGy       [place] 60          3/09/2022        3/15/2022           1b L Frontal      3,000 cGy       [place] 60          3/09/2022        3/15/2022           1c L Parietal     2,000 cGy       [place] 60          3/10/2022        3/10/2022                        INTERVAL SINCE COMPLETION OF RADIATION THERAPY:   2 months    SUBJECTIVE:   Louis Sales is a 61 year old male who is here today for routine 2 month follow up after completing radiation therapy.  Louis states his gamma knife treatment went very well.  He states he has an occasional headache about once or twice a week.  States that it usually just goes away on its own but if it does not he will take a Tylenol and that seems to help.  He denies any other neurological symptoms.  He states he does not have any friends any longer because he is not smoking.    ROS:  Complete review of systems is negative except for symptoms discussed in subjective above.    Current Outpatient Medications   Medication     dexamethasone (DECADRON) 1 MG tablet     dorzolamide-timolol PF (COSOPT) 22.3-6.8 MG/ML opthalmic solution     latanoprost (XALATAN) 0.005 % ophthalmic solution     levETIRAcetam (KEPPRA) 500 MG tablet     No current facility-administered medications for this visit.          Allergies   Allergen Reactions     Dust Mites        Past Medical History:   Diagnosis Date     Borderline glaucoma with ocular hypertension, bilateral      Difficulty walking      Lumbago      Lung cancer (H)          PHYSICAL EXAM:  There were no vitals taken for this visit.  Gen: Alert, in NAD  Neck::  Supple.  No masses or lymphaednopathy palpated.  Pulm: No wheezing, stridor or respiratory distress  CV: Well-perfused, no cyanosis, no pedal edema  Skin: Normal color and turgor  Psychiatric:  Appropriate mood and affect      LABS AND IMAGING:  Reviewed.    IMPRESSION:   Mr. Sales is a 61 year old male with brain mets status post gamma knife 2 months ago and doing very well    PLAN:   Patient has recovered nicely from acute side effects of radiation therapy.  Patient has an MRI and appointment with Dr. Simons scheduled for 6/16.  Patient was unaware of these appointments.  We did review them and we will also send him a written schedule in the mail per his request.      Genevieve Bello NP  Radiation Oncology  [facility]

## 2022-06-14 NOTE — PROGRESS NOTES
Department of Therapeutic Radiology--Radiation Oncology                   [place] [address]  [address] [place]  [address]  Office:  [phone]  Fax:  [phone]   Radiation Oncology Clinic  [address] [place] [address]  Phone:  [phone]  Fax:  [phone]     RE: Louis Sales : 1960   MRN: [id number] LIBERTAD: 2022     OUTPATIENT VISIT NOTE       DIAGNOSIS: Brain Metastases secondary to NSCLC    DOSE:    1. Left frontal Cavity: 2500 cGy in 5 fractions      2. L frontal: 3000 cGy in 5 fractions      3. L Parietal: 2000 cGy in 1 fraction    TYPES OF RADIATION GIVEN: GK SRS    INTERVAL SINCE COMPLETION OF RADIATION THERAPY: 3 months (he completed GK treatment on 3/15/2022)    SUBJECTIVE: Mr. Sales is a 61 year old male with a recent diagnosis of lung cancer with brain   metastases.  He initially presented to ER at [facility] on 2021 following a fall on the ice while chasing his dog.  CT head demonstrated a 5.4 x 4.2 x 4.3 cm left frontal mass with surrounding vasogenic edema and a 10 mm midline shift. There was no evidence of fracture or intracranial bleeding.  CT cervical spine ruled out any spine fracture, but did  a spiculated nodule at the right lung apex. A brain MRI was done on 2021 which redemonstrated the left frontal mass measuring 5.3 x 4.4 x 4.3 cm with intralesional hemorrhage and necrosis as well as another left parietal lesion measuring 0.3 x 0.6 x 0.4 cm. CT of the chest/abdomen/pelvis revealed a spiculated oval mass in the right lung apex measuring 2.6 x 2.0 cm with enlarged right hilar node up to 3 cm. 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      He did have an outpatient PET/CT scan which confirmed the hypermetabolism in the RUL mass and the right hilar node, but otherwise no metastases elsewhere in the body. He did not follow  up with Dr. Mcgee, medical oncology.      He again was taken to the ED after a near syncope episode on 2/12, but didn't want to stay but agreed to return 2 days later on 2/14/22 for intervention. Stereotactic MRI done prior to surgery showed 3 lesions concerning for metastasis in left frontal (6.5 cm in oblique cross section), left parietal (0.8 cm) and left temporal (1.7 cm) areas.  He underwent left craniotomy and gross total resection of the left frontal lesion on 2/16/2022 by Dr. Chiang. Pathology was consistent with metastatic carcinoma, lung primary being a diagnostic consideration.      The patient was referred to us for GK SRS. He tolerated the treatment well without any toxicities. He received 2500 cGy in 5 fractions to the left frontal cavity and 3000 cGy in 5 fractions to the unresected left frontal lesion and a single fraction of 2000 cGy to the smaller left parietal lesion.     Since completing the GK treatment, he was hospitalized from 4/2/2022-4/6/2022 for seizures. He was started on Keppra, Vimpat and Phenytoin. He initiated chemotherapy with Carboplatin/Pemetrxed/Keytruda on 4/11/2022. He was diagnosed with a large Saddle embolus in mid April. US of the leg showed a DVT in the right popliteal and peroneal veins. He last saw Dr. Mcgee on 5/3/2022 with the plan to continue with systemic therapy.     Mr. Sales had a 3 month follow up MRI earlier today and is here for a follow up visit. On interview, he states that he is doing well overall. He has very occasional headaches, probably just twice  in the last month. He reports a new onset pressure sensation behind the left eye, pointing to medial socket at the brow level. He denies focal weakness, numbness, or incontinence. He says his right eye is a little blurry, but attributes that to problem with cataract surgery.      OBJECTIVE:   /75   Pulse 62   Wt 88.3 kg (194 lb 11.2 oz)   SpO2 98%   BMI 30.49 kg/m     Gen: appears well, no acute  distress  HEENT: very mild exophthalmos on the left. Face is symmetric, facial muscle movements intact  CV: well perfused  Resp: breathing comfortably on room air  Neuro: Muscle strength 5/5; sensation to light touch intact; gait and station normal    IMAGING:  3/9/2022 (day of GK SRS)      6/16/2022 (3 mo s/p GK SRS)   precontrast    Post contrast          The other 2 lesions nearly resolved      ASSESSMENT AND PLAN: In summary, Mr. Sales is a 60 yo gentleman with a metastatic NSCLC. He is 3 months status post GK SRS to a left frontal cavity lesion as well as 2 other metastatic lesions in the brain.     I reviewed the images with neuroradiology (formal reading pending). The 2 smaller lesions nearly completely resolved. The left frontal cavity is stable. However, medial to the cavity, there was signal compatible with blood products. This may have contributed to the fullness he felt behind the left eye. It is subacute with bleeding likely occurred 4 weeks ago. He is on anticoagulation, which may have increased his bleeding risk. Other than the pressure sensation, he does not have any worrisome neurologic symptoms related to this. Thus, it can be observed. Alternatively, steroids may be used to reduce the edema surrounding this and the treated frontal cavity.  I will discuss with Dr. Mcgee. The patient is on Immunotherapy for systemic disease.     Mr. Sales will return in 3 months for a repeat brain MRI. An order has been placed.        Michael Simons M.D./Ph.D.  Radiation Oncologist   Department of Therapeutic Radiology   [facility]  Phone: [phone]           45 minutes were spent on the date of the encounter doing chart review, history and exam, documentation and further activities as noted above.           Michael Simons MD

## 2022-06-16 NOTE — PROGRESS NOTES
FOLLOW-UP VISIT    Patient Name: Louis Sales      : 1960     Age: 61 year old        ______________________________________________________________________________     Chief Complaint   Patient presents with     Cancer     Radiation follow up for Gamma knife     /75   Pulse 62   Wt 88.3 kg (194 lb 11.2 oz)   SpO2 98%   BMI 30.49 kg/m       Date Radiation Completed: Gamma knife mask txm x5 completed 3/15/22    Pain  Denies. Has had about 3 HA in the last 3 month, Tylenol with good results    Labs  Other Labs: No    Imaging  MRI: Brain, today    Other Appointments: Yes    MD Name: MRI Appointment Date: today   MD Name: Appointment Date:   MD Name: Appointment Date:   Other Appointment Notes:     Residual Radiation side effect: Pt states he has healed well from the radiaiton     Additional Instructions:     Nurse face-to-face time: Level 3:  10 min face to face time

## 2022-06-16 NOTE — LETTER
2022         RE: Louis Sales  [address]  [address]  [facility] [address]        Dear Colleague,    Thank you for referring your patient, Louis Sales, to the [facility] RADIATION ONCOLOGY. Please see a copy of my visit note below.    Department of Therapeutic Radiology--Radiation Oncology                   [place] [address]  [address] [place]  [address]  Office:  [phone]  Fax:  [phone]   Radiation Oncology Clinic  [address] [place] [address]  Phone:  [phone]  Fax:  [phone]     RE: Louis Sales : 1960   MRN: [id number] LIBERTAD: 2022     OUTPATIENT VISIT NOTE       DIAGNOSIS: Brain Metastases secondary to NSCLC    DOSE:    1. Left frontal Cavity: 2500 cGy in 5 fractions      2. L frontal: 3000 cGy in 5 fractions      3. L Parietal: 2000 cGy in 1 fraction    TYPES OF RADIATION GIVEN: GK SRS    INTERVAL SINCE COMPLETION OF RADIATION THERAPY: 3 months (he completed GK treatment on 3/15/2022)    SUBJECTIVE: Mr. Sales is a 61 year old male with a recent diagnosis of lung cancer with brain   metastases.  He initially presented to ER at [facility] on 2021 following a fall on the ice while chasing his dog.  CT head demonstrated a 5.4 x 4.2 x 4.3 cm left frontal mass with surrounding vasogenic edema and a 10 mm midline shift. There was no evidence of fracture or intracranial bleeding.  CT cervical spine ruled out any spine fracture, but did  a spiculated nodule at the right lung apex. A brain MRI was done on 2021 which redemonstrated the left frontal mass measuring 5.3 x 4.4 x 4.3 cm with intralesional hemorrhage and necrosis as well as another left parietal lesion measuring 0.3 x 0.6 x 0.4 cm. CT of the chest/abdomen/pelvis revealed a spiculated oval mass in the right lung apex measuring 2.6 x 2.0 cm with enlarged right hilar node up to 3 cm. He was recommended surgical resection, but  declined as he had important business to take care of with recent death of his wife.      He did have an outpatient PET/CT scan which confirmed the hypermetabolism in the RUL mass and the right hilar node, but otherwise no metastases elsewhere in the body. He did not follow up with Dr. Mcgee, medical oncology.      He again was taken to the ED after a near syncope episode on 2/12, but didn't want to stay but agreed to return 2 days later on 2/14/22 for intervention. Stereotactic MRI done prior to surgery showed 3 lesions concerning for metastasis in left frontal (6.5 cm in oblique cross section), left parietal (0.8 cm) and left temporal (1.7 cm) areas.  He underwent left craniotomy and gross total resection of the left frontal lesion on 2/16/2022 by Dr. Chiang. Pathology was consistent with metastatic carcinoma, lung primary being a diagnostic consideration.      The patient was referred to us for GK SRS. He tolerated the treatment well without any toxicities. He received 2500 cGy in 5 fractions to the left frontal cavity and 3000 cGy in 5 fractions to the unresected left frontal lesion and a single fraction of 2000 cGy to the smaller left parietal lesion.     Since completing the GK treatment, he was hospitalized from 4/2/2022-4/6/2022 for seizures. He was started on Keppra, Vimpat and Phenytoin. He initiated chemotherapy with Carboplatin/Pemetrxed/Keytruda on 4/11/2022. He was diagnosed with a large Saddle embolus in mid April. US of the leg showed a DVT in the right popliteal and peroneal veins. He last saw Dr. Mcgee on 5/3/2022 with the plan to continue with systemic therapy.     Mr. Sales had a 3 month follow up MRI earlier today and is here for a follow up visit. On interview, he states that he is doing well overall. He has very occasional headaches, probably just twice  in the last month. He reports a new onset pressure sensation behind the left eye, pointing to medial socket at the brow level. He denies  focal weakness, numbness, or incontinence. He says his right eye is a little blurry, but attributes that to problem with cataract surgery.      OBJECTIVE:   /75   Pulse 62   Wt 88.3 kg (194 lb 11.2 oz)   SpO2 98%   BMI 30.49 kg/m     Gen: appears well, no acute distress  HEENT: very mild exophthalmos on the left. Face is symmetric, facial muscle movements intact  CV: well perfused  Resp: breathing comfortably on room air  Neuro: Muscle strength 5/5; sensation to light touch intact; gait and station normal    IMAGING:  3/9/2022 (day of GK SRS)      6/16/2022 (3 mo s/p GK SRS)   precontrast    Post contrast          The other 2 lesions nearly resolved      ASSESSMENT AND PLAN: In summary, Mr. Sales is a 60 yo gentleman with a metastatic NSCLC. He is 3 months status post GK SRS to a left frontal cavity lesion as well as 2 other metastatic lesions in the brain.     I reviewed the images with neuroradiology (formal reading pending). The 2 smaller lesions nearly completely resolved. The left frontal cavity is stable. However, medial to the cavity, there was signal compatible with blood products. This may have contributed to the fullness he felt behind the left eye. It is subacute with bleeding likely occurred 4 weeks ago. He is on anticoagulation, which may have increased his bleeding risk. Other than the pressure sensation, he does not have any worrisome neurologic symptoms related to this. Thus, it can be observed. Alternatively, steroids may be used to reduce the edema surrounding this and the treated frontal cavity.  I will discuss with Dr. Mcgee. The patient is on Immunotherapy for systemic disease.     Mr. Sales will return in 3 months for a repeat brain MRI. An order has been placed.        Michael Simons M.D./Ph.D.  Radiation Oncologist   Department of Therapeutic Radiology   [facility]  Phone: [phone]           45 minutes were spent on the date of the encounter doing  chart review, history and exam, documentation and further activities as noted above.           Michael Simons MD    FOLLOW-UP VISIT    Patient Name: Louis Sales      : 1960     Age: 61 year old        ______________________________________________________________________________     Chief Complaint   Patient presents with     Cancer     Radiation follow up for Gamma knife     /75   Pulse 62   Wt 88.3 kg (194 lb 11.2 oz)   SpO2 98%   BMI 30.49 kg/m       Date Radiation Completed: Gamma knife mask txm x5 completed 3/15/22    Pain  Denies. Has had about 3 HA in the last 3 month, Tylenol with good results    Labs  Other Labs: No    Imaging  MRI: Brain, today    Other Appointments: Yes    MD Name: MRI Appointment Date: today   MD Name: Appointment Date:   MD Name: Appointment Date:   Other Appointment Notes:     Residual Radiation side effect: Pt states he has healed well from the radiaiton     Additional Instructions:     Nurse face-to-face time: Level 3:  10 min face to face time            Again, thank you for allowing me to participate in the care of your patient.        Sincerely,        Michael Simons MD